# Patient Record
Sex: FEMALE | Race: WHITE | NOT HISPANIC OR LATINO | URBAN - METROPOLITAN AREA
[De-identification: names, ages, dates, MRNs, and addresses within clinical notes are randomized per-mention and may not be internally consistent; named-entity substitution may affect disease eponyms.]

---

## 2021-11-08 ENCOUNTER — EMERGENCY (EMERGENCY)
Facility: HOSPITAL | Age: 46
LOS: 0 days | Discharge: HOME | End: 2021-11-08
Attending: EMERGENCY MEDICINE | Admitting: EMERGENCY MEDICINE
Payer: MEDICAID

## 2021-11-08 VITALS
TEMPERATURE: 98 F | DIASTOLIC BLOOD PRESSURE: 70 MMHG | OXYGEN SATURATION: 99 % | SYSTOLIC BLOOD PRESSURE: 113 MMHG | WEIGHT: 160.06 LBS | HEART RATE: 79 BPM | RESPIRATION RATE: 18 BRPM

## 2021-11-08 DIAGNOSIS — K08.89 OTHER SPECIFIED DISORDERS OF TEETH AND SUPPORTING STRUCTURES: ICD-10-CM

## 2021-11-08 DIAGNOSIS — K02.9 DENTAL CARIES, UNSPECIFIED: ICD-10-CM

## 2021-11-08 PROCEDURE — 99284 EMERGENCY DEPT VISIT MOD MDM: CPT

## 2021-11-08 NOTE — ED PROVIDER NOTE - CLINICAL SUMMARY MEDICAL DECISION MAKING FREE TEXT BOX
46yo F presenting with L mandibular dentalgia x2d after eating last night. Sudden onset. no other complaints. will aou dental

## 2021-11-08 NOTE — ED PROVIDER NOTE - PHYSICAL EXAMINATION
Well appearing, NAD, non toxic. NCAT PERRLA EOMI Airway intact, no drooling, no stridor, no pooling of secretions, no posterior oropharyngeal erythema/edema/lesions. Speaking in full sentences. +tolerating secretions, tolerating PO +L mandibular dental carry normal wob  neuro non focal

## 2021-11-08 NOTE — CONSULT NOTE ADULT - SUBJECTIVE AND OBJECTIVE BOX
Patient is a 45y old female who presents with a chief complaint of pain on the lower left side on the jaw.     HPI: Patient reported that she broke part of her tooth while eating last night. she reported a mild pain on the lower left tooth.      PAST MEDICAL & SURGICAL HISTORY:  osteoarthritis       ( -  ) heart valve replacement  (  - ) joint replacement  ( -  ) pregnancy    MEDICATIONS  (STANDING): Patient reported taking no medication    MEDICATIONS  (PRN): Patient was prescribed 500 mg of amoxicillin 8qh for 7 days, and 600 mg Ibuprofen 6qh take as needed for pain.       Allergies    No Known Allergies    Intolerances        FAMILY HISTORY:      *SOCIAL HISTORY: (  -) Tobacco; ( - ) ETOH    *Last Dental Visit: 2 years ago, Patient doesnt know who is his private dentist in network     Vital Signs Last 24 Hrs  T(C): 36.9 (13 Aug 2021 11:48), Max: 36.9 (13 Aug 2021 11:48)  T(F): 98.5 (13 Aug 2021 11:48), Max: 98.5 (13 Aug 2021 11:48)  HR: 78 (13 Aug 2021 11:48) (78 - 78)  BP: 110/56 (13 Aug 2021 11:48) (110/56 - 110/56)  BP(mean): --  RR: 18 (13 Aug 2021 11:48) (18 - 18)  SpO2: 99% (13 Aug 2021 11:48) (99% - 99%)    EOE:  TMJ (  -) clicks                     (  -) pops                     (  -) crepitus             Mandible <<FROM>>             Facial bones and MOM <<grossly intact>>             (  -) trismus             ( - ) lymphadenopathy             (  -) swelling             (  -) asymmetry             ( - ) palpation             ( - ) dyspnea             ( - ) dysphagia             ( - ) loss of consciousness    IOE:  <<permanent/primary/mixed>> dentition: <<grossly intact>> OR <<multiple carious teeth>> OR <<multiple missing teeth>>           hard/soft palate:  (   ) palatal torus, <<No pathology noted>>           tongue/FOM <<No pathology noted>>           labial/buccal mucosa <<No pathology noted>>           ( + ) percussion # 18           ( + ) palpation +18           ( - ) swelling            ( - ) abscess           ( - ) sinus tract        Caries: Deep caries in to the pulp, tooth is restorable and need RCT tooth #18      *DENTAL RADIOGRAPHS: 1 Periapical Xray was taken         *ASSESSMENT: radiographically and Clinically the tooth #18 has a deep cavity in to the pulp, No signs of periapical radiolucency, tooth is vital with coronal exposure from caries. Diagnosis " Irreversible Pulpitis with Asymptomatic apical periodontists.  Based on the amount of tooth structure remaining, Tooth # 18 is restorable and need RCT.       *PLAN: Patient was asked to call her assigned in network dentist to start on root canal therapy on tooth #18, In the meanwhile patient was prescribed Amoxicillin 500 mg every 8 hours for 7 days and 600 mg ibuprofen every 6 hours as needed. Patient was recommended to have tooth # 17 extracted prior restoring # 18 with crown.        RECOMMENDATIONS:  1) Patient recommended to see her assigned dentist as soon as possible to get RCT initiated in tooth # 18. Patient was asked to take the prescribed medication on time.   2) Dental F/U with outpatient dentist for comprehensive dental care.   3) If any difficulty swallowing/breathing, fever occur, return to ER.     Dr. Luis, Pager [phone]

## 2021-11-08 NOTE — ED PROVIDER NOTE - OBJECTIVE STATEMENT
46yo F presenting with L mandibular dentalgia x2d after eating last night. Sudden onset. no other complaints.

## 2021-11-12 ENCOUNTER — OUTPATIENT (OUTPATIENT)
Dept: OUTPATIENT SERVICES | Facility: HOSPITAL | Age: 46
LOS: 1 days | Discharge: HOME | End: 2021-11-12

## 2023-04-18 PROBLEM — Z00.00 ENCOUNTER FOR PREVENTIVE HEALTH EXAMINATION: Status: ACTIVE | Noted: 2023-04-18

## 2023-05-05 ENCOUNTER — APPOINTMENT (OUTPATIENT)
Dept: UROGYNECOLOGY | Facility: CLINIC | Age: 48
End: 2023-05-05
Payer: COMMERCIAL

## 2023-05-05 VITALS
SYSTOLIC BLOOD PRESSURE: 119 MMHG | BODY MASS INDEX: 26.46 KG/M2 | HEART RATE: 90 BPM | WEIGHT: 155 LBS | DIASTOLIC BLOOD PRESSURE: 81 MMHG | HEIGHT: 64 IN

## 2023-05-05 DIAGNOSIS — M50.10 CERVICAL DISC DISORDER WITH RADICULOPATHY, UNSPECIFIED CERVICAL REGION: ICD-10-CM

## 2023-05-05 DIAGNOSIS — M19.90 UNSPECIFIED OSTEOARTHRITIS, UNSPECIFIED SITE: ICD-10-CM

## 2023-05-05 DIAGNOSIS — Z87.59 PERSONAL HISTORY OF OTHER COMPLICATIONS OF PREGNANCY, CHILDBIRTH AND THE PUERPERIUM: ICD-10-CM

## 2023-05-05 DIAGNOSIS — Z78.9 OTHER SPECIFIED HEALTH STATUS: ICD-10-CM

## 2023-05-05 PROCEDURE — 99205 OFFICE O/P NEW HI 60 MIN: CPT | Mod: 25

## 2023-05-05 PROCEDURE — 51701 INSERT BLADDER CATHETER: CPT

## 2023-05-07 LAB — URINE CULTURE <10: NORMAL

## 2023-05-08 NOTE — COUNSELING
[FreeTextEntry1] : \par We will notify you of the urine results if they are abnormal\par \par Referral to pelvic floor physical therapy\par \par [facility]\par [address] [place] [address]\par Phone: [phone]\par \par [facility]\par [address]\par [facility] [address]\par \par \par Please schedule a pessary fitting with my PAVanita

## 2023-05-08 NOTE — HISTORY OF PRESENT ILLNESS
[FreeTextEntry1] : \par Pt with pelvic floor dysfunction here for urogynecologic evaluation. She describes: \par \par Chief PFD: stress incontinence, "too loose during sex"\par \par Pelvic organ prolapse: s/p c/s x 2, s/p tubal, no bulge, denies splinting\par Stress urinary incontinence: with cough, sneeze, laugh, lifting, LIZET: S>U\par Overactive bladder syndrome: voids q30 minutes secondary to urgency and incontinence, urge incontinence a couple of times per week, for around 1 year, no prior treatment, no glaucoma\par Voiding dysfunction: no Incomplete bladder emptying, no hesitancy \par Lower urinary tract/vaginal symptoms: no recurrent UTIs per year, no hematuria, no dysuria, no bladder pain \par Fecal incontinence: denies\par Defecatory dysfunction: sausage and Type I\par Sexual dysfunction: intermittent pain deeper in \par Pelvic pain: denies\par Vaginal dryness: denies\par \par Her pelvic floor symptoms are significantly bothersome and negatively impacting her quality of life. \par \par

## 2023-05-08 NOTE — DISCUSSION/SUMMARY
[FreeTextEntry1] : \par Mixed incontinence\par The pathophysiology of the above condition was discussed with the patient. The management options for stress incontinence were discussed including observation, pessary placement, pelvic floor physical therapy or surgery. We will follow up on her urine tests. The patient voiced understanding and agrees with a referral to PT and pessary management. She will be scheduled for a pessary fitting. \par

## 2023-05-08 NOTE — PHYSICAL EXAM
[Chaperone Present] : A chaperone was present in the examining room during all aspects of the physical examination [FreeTextEntry1] : Void: 300 cc\par PVR: 20 cc\par Urethra was prepped in sterile fashion and then a sterile non- indwelling catheter (14F) was used by me to drain the bladder. The patient tolerated the procedure well.\par Indication: mixed incontinence\par  \par Well healed incision: Pfannenstiel\par normal perineal sensation\par normal perineal reflexes\par negative cough stress test\par positive atrophy\par no prolapse\par positive urethral hypermobility\par bilateral levator ani spasm, positive R tenderness\par no urethral tenderness\par no bladder tenderness\par no cervical tenderness\par 1/5 Kegel\par

## 2023-05-17 ENCOUNTER — APPOINTMENT (OUTPATIENT)
Dept: UROGYNECOLOGY | Facility: CLINIC | Age: 48
End: 2023-05-17

## 2023-05-19 ENCOUNTER — TRANSCRIPTION ENCOUNTER (OUTPATIENT)
Age: 48
End: 2023-05-19

## 2023-05-30 ENCOUNTER — APPOINTMENT (OUTPATIENT)
Dept: UROGYNECOLOGY | Facility: CLINIC | Age: 48
End: 2023-05-30
Payer: COMMERCIAL

## 2023-05-30 VITALS
HEIGHT: 64 IN | SYSTOLIC BLOOD PRESSURE: 130 MMHG | HEART RATE: 82 BPM | BODY MASS INDEX: 26.46 KG/M2 | DIASTOLIC BLOOD PRESSURE: 85 MMHG | WEIGHT: 155 LBS

## 2023-05-30 PROCEDURE — A4562: CPT

## 2023-05-30 PROCEDURE — 57160 INSERT PESSARY/OTHER DEVICE: CPT

## 2023-05-30 NOTE — COUNSELING
[FreeTextEntry1] : Please call the office if you have any issues with vaginal pain, vaginal bleeding, difficulty urinating or having a bowel movement or if the pessary falls out so that we can arrange another size pessary.\par \par Please follow up for pessary maintenance in 2-3 weeks with KWASI Waldorn\par

## 2023-05-30 NOTE — HISTORY OF PRESENT ILLNESS
[FreeTextEntry1] : Patient is here for pessary fitting. \par Last seen 5/5/2023 for stress incontinence\par \par s/p c/s x 2, s/p tubal\par \par LIZET: S>U\par \par Patient is here for a pessary fitting for stress incontinence. Notes that she wants to try a pessary and if it does not help, she will consider surgical management.

## 2023-05-30 NOTE — DISCUSSION/SUMMARY
[FreeTextEntry1] : \par Mixed Incontinence:\par Dish with support with knob # 2  placed without difficulty. Remained in place with Valsalva, coughing, and ambulating. \par The patient tolerated all fittings well.\par NEW [facility] with support with knob # 2 placed\par Precautions reviewed\par Will return for follow up pessary check in 2-3 weeks or earlier if she has any issues\par

## 2023-06-21 ENCOUNTER — APPOINTMENT (OUTPATIENT)
Dept: UROGYNECOLOGY | Facility: CLINIC | Age: 48
End: 2023-06-21
Payer: COMMERCIAL

## 2023-06-21 VITALS
SYSTOLIC BLOOD PRESSURE: 108 MMHG | DIASTOLIC BLOOD PRESSURE: 78 MMHG | BODY MASS INDEX: 27.31 KG/M2 | HEART RATE: 96 BPM | WEIGHT: 160 LBS | HEIGHT: 64 IN

## 2023-06-21 PROCEDURE — 99214 OFFICE O/P EST MOD 30 MIN: CPT

## 2023-06-21 NOTE — HISTORY OF PRESENT ILLNESS
[FreeTextEntry1] : Patient is here for routine pessary cleaning for mixed incontinence\par Last seen 5/30/2023 for pessary fitting. \par \par s/p c/s x 2, s/p tubal\par \par LIZET: S>U\par \par Fitted: Dish with support with knob #2 \par \par Today, patient is doing well and has no complaints. Notes that she was coughing a lot and experienced a small amount of leakage on Sunday. She is very happy with the pessary but is considering surgical management in the future. \par \par

## 2023-06-21 NOTE — DISCUSSION/SUMMARY
[FreeTextEntry1] : Mixed Incontinence\par Dish with support with knob # 2 removed, cleaned, inspected and reinserted. The patient tolerated this well. Knob was turned to the side and not positioned under the urethra. \par No bleeding, lesions, abnormal discharge were noted. \par The patient will follow up in 3 months for routine pessary management.\par \par Patient is considering surgical management, she will call the office if she wants to go ahead with surgical management and schedule UDS and surgical counseling with Dr. Fields. \par

## 2023-06-21 NOTE — COUNSELING
[FreeTextEntry1] : Please call the office if you have any issues with vaginal pain, vaginal bleeding, difficulty urinating or having a bowel movement or if the pessary falls out so that we can arrange another size pessary.\par \par Please follow up for pessary maintenance in 3 months with KWASI Waldron\par

## 2023-09-20 ENCOUNTER — APPOINTMENT (OUTPATIENT)
Dept: UROGYNECOLOGY | Facility: CLINIC | Age: 48
End: 2023-09-20

## 2023-09-27 ENCOUNTER — APPOINTMENT (OUTPATIENT)
Dept: PEDIATRIC ALLERGY IMMUNOLOGY | Facility: CLINIC | Age: 48
End: 2023-09-27
Payer: MEDICAID

## 2023-09-27 ENCOUNTER — APPOINTMENT (OUTPATIENT)
Dept: UROGYNECOLOGY | Facility: CLINIC | Age: 48
End: 2023-09-27
Payer: MEDICAID

## 2023-09-27 VITALS
HEIGHT: 64 IN | SYSTOLIC BLOOD PRESSURE: 94 MMHG | WEIGHT: 160 LBS | HEART RATE: 105 BPM | BODY MASS INDEX: 27.31 KG/M2 | DIASTOLIC BLOOD PRESSURE: 63 MMHG

## 2023-09-27 VITALS
HEIGHT: 64 IN | DIASTOLIC BLOOD PRESSURE: 80 MMHG | SYSTOLIC BLOOD PRESSURE: 120 MMHG | BODY MASS INDEX: 28 KG/M2 | WEIGHT: 164 LBS

## 2023-09-27 DIAGNOSIS — N39.46 MIXED INCONTINENCE: ICD-10-CM

## 2023-09-27 PROCEDURE — 99204 OFFICE O/P NEW MOD 45 MIN: CPT | Mod: 25

## 2023-09-27 PROCEDURE — 94060 EVALUATION OF WHEEZING: CPT

## 2023-09-27 PROCEDURE — 99214 OFFICE O/P EST MOD 30 MIN: CPT

## 2023-09-29 ENCOUNTER — APPOINTMENT (OUTPATIENT)
Dept: PEDIATRIC ALLERGY IMMUNOLOGY | Facility: CLINIC | Age: 48
End: 2023-09-29
Payer: MEDICAID

## 2023-09-29 VITALS — HEIGHT: 64 IN | BODY MASS INDEX: 28 KG/M2 | WEIGHT: 164 LBS

## 2023-09-29 DIAGNOSIS — J45.40 MODERATE PERSISTENT ASTHMA, UNCOMPLICATED: ICD-10-CM

## 2023-09-29 PROCEDURE — 99213 OFFICE O/P EST LOW 20 MIN: CPT | Mod: 25

## 2023-09-29 PROCEDURE — 95004 PERQ TESTS W/ALRGNC XTRCS: CPT

## 2023-10-06 ENCOUNTER — APPOINTMENT (OUTPATIENT)
Dept: PEDIATRIC ALLERGY IMMUNOLOGY | Facility: CLINIC | Age: 48
End: 2023-10-06
Payer: MEDICAID

## 2023-10-06 DIAGNOSIS — J30.2 OTHER ALLERGIC RHINITIS: ICD-10-CM

## 2023-10-06 DIAGNOSIS — J30.89 OTHER ALLERGIC RHINITIS: ICD-10-CM

## 2023-10-06 PROCEDURE — 95117 IMMUNOTHERAPY INJECTIONS: CPT

## 2023-10-13 ENCOUNTER — APPOINTMENT (OUTPATIENT)
Dept: PEDIATRIC ALLERGY IMMUNOLOGY | Facility: CLINIC | Age: 48
End: 2023-10-13
Payer: MEDICAID

## 2023-10-13 PROCEDURE — 95117 IMMUNOTHERAPY INJECTIONS: CPT

## 2023-10-20 ENCOUNTER — RX RENEWAL (OUTPATIENT)
Age: 48
End: 2023-10-20

## 2023-10-20 RX ORDER — FLUTICASONE PROPIONATE 220 UG/1
220 AEROSOL, METERED RESPIRATORY (INHALATION) TWICE DAILY
Qty: 1 | Refills: 0 | Status: ACTIVE | COMMUNITY
Start: 2023-09-27 | End: 1900-01-01

## 2023-10-27 ENCOUNTER — APPOINTMENT (OUTPATIENT)
Dept: PEDIATRIC ALLERGY IMMUNOLOGY | Facility: CLINIC | Age: 48
End: 2023-10-27
Payer: MEDICAID

## 2023-10-27 PROCEDURE — 95117 IMMUNOTHERAPY INJECTIONS: CPT

## 2023-10-31 ENCOUNTER — APPOINTMENT (OUTPATIENT)
Dept: PEDIATRIC ALLERGY IMMUNOLOGY | Facility: CLINIC | Age: 48
End: 2023-10-31
Payer: MEDICAID

## 2023-10-31 PROCEDURE — 95165 ANTIGEN THERAPY SERVICES: CPT

## 2023-11-03 ENCOUNTER — APPOINTMENT (OUTPATIENT)
Dept: PEDIATRIC ALLERGY IMMUNOLOGY | Facility: CLINIC | Age: 48
End: 2023-11-03
Payer: MEDICAID

## 2023-11-03 PROCEDURE — 95117 IMMUNOTHERAPY INJECTIONS: CPT

## 2023-11-10 ENCOUNTER — APPOINTMENT (OUTPATIENT)
Dept: PEDIATRIC ALLERGY IMMUNOLOGY | Facility: CLINIC | Age: 48
End: 2023-11-10
Payer: MEDICAID

## 2023-11-10 PROCEDURE — 95117 IMMUNOTHERAPY INJECTIONS: CPT

## 2023-11-10 RX ORDER — FEXOFENADINE HYDROCHLORIDE 180 MG/1
180 TABLET ORAL DAILY
Qty: 30 | Refills: 2 | Status: ACTIVE | COMMUNITY
Start: 2023-11-10 | End: 1900-01-01

## 2023-11-15 ENCOUNTER — OUTPATIENT (OUTPATIENT)
Dept: OUTPATIENT SERVICES | Facility: HOSPITAL | Age: 48
LOS: 1 days | End: 2023-11-15
Payer: COMMERCIAL

## 2023-11-15 DIAGNOSIS — K02.9 DENTAL CARIES, UNSPECIFIED: ICD-10-CM

## 2023-11-15 DIAGNOSIS — K01.1 IMPACTED TEETH: ICD-10-CM

## 2023-11-15 PROCEDURE — D0220: CPT

## 2023-11-15 PROCEDURE — D0120: CPT

## 2023-11-15 PROCEDURE — D0140: CPT

## 2023-11-17 ENCOUNTER — APPOINTMENT (OUTPATIENT)
Dept: PEDIATRIC ALLERGY IMMUNOLOGY | Facility: CLINIC | Age: 48
End: 2023-11-17
Payer: MEDICAID

## 2023-11-17 DIAGNOSIS — J30.1 ALLERGIC RHINITIS DUE TO POLLEN: ICD-10-CM

## 2023-11-17 PROCEDURE — 95117 IMMUNOTHERAPY INJECTIONS: CPT

## 2023-11-21 ENCOUNTER — APPOINTMENT (OUTPATIENT)
Dept: UROGYNECOLOGY | Facility: CLINIC | Age: 48
End: 2023-11-21

## 2023-11-29 ENCOUNTER — RX RENEWAL (OUTPATIENT)
Age: 48
End: 2023-11-29

## 2023-11-29 RX ORDER — MONTELUKAST 10 MG/1
10 TABLET, FILM COATED ORAL
Qty: 30 | Refills: 0 | Status: ACTIVE | COMMUNITY
Start: 2023-09-27 | End: 1900-01-01

## 2023-12-04 ENCOUNTER — APPOINTMENT (OUTPATIENT)
Dept: UROGYNECOLOGY | Facility: CLINIC | Age: 48
End: 2023-12-04

## 2024-02-15 ENCOUNTER — RX RENEWAL (OUTPATIENT)
Age: 49
End: 2024-02-15

## 2024-02-21 ENCOUNTER — RX RENEWAL (OUTPATIENT)
Age: 49
End: 2024-02-21

## 2024-03-18 ENCOUNTER — APPOINTMENT (OUTPATIENT)
Dept: PEDIATRIC ALLERGY IMMUNOLOGY | Facility: CLINIC | Age: 49
End: 2024-03-18

## 2024-05-28 ENCOUNTER — APPOINTMENT (OUTPATIENT)
Dept: PEDIATRIC ALLERGY IMMUNOLOGY | Facility: CLINIC | Age: 49
End: 2024-05-28

## 2024-10-02 ENCOUNTER — APPOINTMENT (OUTPATIENT)
Dept: PAIN MANAGEMENT | Facility: CLINIC | Age: 49
End: 2024-10-02
Payer: MEDICAID

## 2024-10-02 DIAGNOSIS — M54.12 RADICULOPATHY, CERVICAL REGION: ICD-10-CM

## 2024-10-02 PROCEDURE — 99204 OFFICE O/P NEW MOD 45 MIN: CPT

## 2024-10-02 RX ORDER — IBUPROFEN 600 MG/1
600 TABLET, FILM COATED ORAL TWICE DAILY
Qty: 60 | Refills: 0 | Status: ACTIVE | COMMUNITY
Start: 2024-10-02 | End: 1900-01-01

## 2024-10-02 RX ORDER — CYCLOBENZAPRINE HYDROCHLORIDE 10 MG/1
10 TABLET, FILM COATED ORAL 3 TIMES DAILY
Qty: 90 | Refills: 1 | Status: ACTIVE | COMMUNITY
Start: 2024-10-02 | End: 1900-01-01

## 2024-10-03 NOTE — HISTORY OF PRESENT ILLNESS
[FreeTextEntry1] : Patient is a 48-year-old female who is here today to establish care for neck and lower back pain. As for her neck pain in, patient's status post C2-T2 cervical fusion x 2, first 1 was done in March or 2023 and the second 1 was on March 2, 2024 by Dr. Razo at [facility].  Patient states that since the surgery her pain has gotten better and her radicular feature has improved, patient is no longer getting numbness or tingling down her upper extremities.  She has started neck PT 3 weeks ago, she continues PT 2-3 times a week. As for her lower back pain, pain has been going on for the past 2 to 3 years and is gradually getting worse, pain associated with numbness tingling and sharp shooting pain that is going down her left lower extremity, patient states that she feels weak and that both of her legs feel heavy most of the time.  Pain is worse when she stands or walks for a long period of time and it gets better when she sits and relaxes, pain is worse at night and sometimes she is unable to sleep. Patient has been managing her pain with ibuprofen 600 mg as needed and cyclobenzaprine as needed.

## 2024-10-03 NOTE — DISCUSSION/SUMMARY
[de-identified] : A discussion regarding available pain management treatment options occurred with the patient. These included interventional, rehabilitative, pharmacological, and alternative modalities. We will proceed with the following:  Interventional/ Procedures: 1. None indicated at this time.  We will possibly do a future epidural injection depending on the MRI results Patient had cervical and lumbar MRI at [facility], we will obtain results from the hospital   Medication/pharmacological: 1. Ordered cyclobenzaprine 10 mg 3 times daily as needed - We are prescribing a muscle relaxant medication to help the patient's muscle spasm pain. The patient understands to not take this medication before driving or operating any heavy machinery as it can be sedating. 2. Ordered ibuprofen 600 mg twice daily - I refilled a 30-day supply today. - Pt was advised to take the medication daily for 15 days, then take a one-week break, following that they could take it PRN. - I advised the patient that the NSAID should be taken with food. In addition, while taking the prescribed NSAID, no over the counter or other NSAIDs should be used, such as ibuprofen (Motrin or Advil) or naproxen (Aleve) as this can cause stomach upset or other side effects. If needed for fever or breakthrough pain Tylenol can be used.     Rehabilitative/alternative modalities: 1. Initiate physician directed activity and lifestyle modifications. 2. Participation in active HEP was discussed and printed. 3. Patient was advised to stay away from any heavy lifting. If needed, she was advised to squat and not bend forward.      Total clinician time spent today on the patient is 30 minutes including preparing to see the patient, obtaining and/or reviewing and confirming history, performing medically necessary and appropriate examination, counseling and educating the patient and/or family, documenting clinical information in the EHR and communicating and/or referring to other healthcare professionals.  EBEN Wright DO

## 2024-10-03 NOTE — PHYSICAL EXAM
[de-identified] : Cervical Spine Exam: Inspection: Erythema: (-) Ecchymosis: (-) Rashes: (-)   Palpation: Trapezius spasms (+) Trapezius tenderness (+) Paracervical spasms (+) Paracervical tenderness (+) Rhomboid spasms (-) Rhomboid tenderness (-)   Cervical  ROM Limited ROM and pain with Forward Flexion Limited ROM and pain with LT and RT rotation     Strength: Deltoid: R (5/5) L (5/5) Biceps: R (5/5) L (5/5) Triceps: R (5/5) L (5/5) Wrist flexors: R (5/5) L (4+/5) Finger flexors: R (5/5) L (4+/5) Grasp: R (5/5) L (4+/5)   Special Tests: Spurling: R (-) ; L (-) Facet loading (Bush's test): R (-) ; L (-) Vicente reflex: R (-) ; L (-)   Neurologic: Light touch intact throughout LE Reflexes normal and symmetric       Lumbar Spine Exam:   Inspection: Erythema: (-) Ecchymosis: (-) Rashes: (-)     Alignment: No spine misalignment, scoliosis, or Kyphosis  Elevated right rib hump with forward bending: (-) Elevated left rib hump with forward bending: (-)  RT/LT scapula winging (-)       Palpation: Midline lumbar tenderness: (+)      Lumbar ROM  Limited ROM and pain with Flexion       Strength: Hip Flexion: R (4+/5) L (4+/5) Knee extension: R (4+/5) L (4+/5)  Knee flexion: R (5/5) L (5/5) Ankle Dorsiflexion: R (5/5) L(5/5) EHL: R (5/5) L (5/5) Ankle Plantarflexion: R (5/5) L (5/5)       Special Tests: - Positive SLR: Positive bilaterally - Positive Slump test: Positive bilaterally - Negative Facet loading/Extension Rotation Test b/l - BRET test: Negative bilaterally       Neurologic: Light touch intact throughout LE Reflexes normal and symmetric       Gait: Normal gait, stable, walks without assistance. Normal toe and heel walking. No signs of foot drop, Drag and slap test (-)

## 2024-10-03 NOTE — HISTORY OF PRESENT ILLNESS
[FreeTextEntry1] : Patient is a 48-year-old female who is here today to establish care for neck and lower back pain. As for her neck pain in, patient's status post C2-T2 cervical fusion x 2, first 1 was done in March or 2023 and the second 1 was on March 2, 2024 by Dr. Razo at [facility].  Patient states that since the surgery her pain has gotten better and her radicular feature has improved, patient is no longer getting numbness or tingling down her upper extremities.  She has started neck PT 3 weeks ago, she continues PT 2-3 times a week. As for her lower back pain, pain has been going on for the past 2 to 3 years and is gradually getting worse, pain associated with numbness tingling and sharp shooting pain that is going down her left lower extremity, patient states that she feels weak and that both of her legs feel heavy most of the time.  Pain is worse when she stands or walks for a long period of time and it gets better when she sits and relaxes, pain is worse at night and sometimes she is unable to sleep. Patient has been managing her pain with ibuprofen 600 mg as needed and cyclobenzaprine as needed.

## 2024-10-03 NOTE — PHYSICAL EXAM
[de-identified] : Cervical Spine Exam: Inspection: Erythema: (-) Ecchymosis: (-) Rashes: (-)   Palpation: Trapezius spasms (+) Trapezius tenderness (+) Paracervical spasms (+) Paracervical tenderness (+) Rhomboid spasms (-) Rhomboid tenderness (-)   Cervical  ROM Limited ROM and pain with Forward Flexion Limited ROM and pain with LT and RT rotation     Strength: Deltoid: R (5/5) L (5/5) Biceps: R (5/5) L (5/5) Triceps: R (5/5) L (5/5) Wrist flexors: R (5/5) L (4+/5) Finger flexors: R (5/5) L (4+/5) Grasp: R (5/5) L (4+/5)   Special Tests: Spurling: R (-) ; L (-) Facet loading (Bush's test): R (-) ; L (-) Vicente reflex: R (-) ; L (-)   Neurologic: Light touch intact throughout LE Reflexes normal and symmetric       Lumbar Spine Exam:   Inspection: Erythema: (-) Ecchymosis: (-) Rashes: (-)     Alignment: No spine misalignment, scoliosis, or Kyphosis  Elevated right rib hump with forward bending: (-) Elevated left rib hump with forward bending: (-)  RT/LT scapula winging (-)       Palpation: Midline lumbar tenderness: (+)      Lumbar ROM  Limited ROM and pain with Flexion       Strength: Hip Flexion: R (4+/5) L (4+/5) Knee extension: R (4+/5) L (4+/5)  Knee flexion: R (5/5) L (5/5) Ankle Dorsiflexion: R (5/5) L(5/5) EHL: R (5/5) L (5/5) Ankle Plantarflexion: R (5/5) L (5/5)       Special Tests: - Positive SLR: Positive bilaterally - Positive Slump test: Positive bilaterally - Negative Facet loading/Extension Rotation Test b/l - BRET test: Negative bilaterally       Neurologic: Light touch intact throughout LE Reflexes normal and symmetric       Gait: Normal gait, stable, walks without assistance. Normal toe and heel walking. No signs of foot drop, Drag and slap test (-)

## 2024-10-03 NOTE — DISCUSSION/SUMMARY
[de-identified] : A discussion regarding available pain management treatment options occurred with the patient. These included interventional, rehabilitative, pharmacological, and alternative modalities. We will proceed with the following:  Interventional/ Procedures: 1. None indicated at this time.  We will possibly do a future epidural injection depending on the MRI results Patient had cervical and lumbar MRI at [facility], we will obtain results from the hospital   Medication/pharmacological: 1. Ordered cyclobenzaprine 10 mg 3 times daily as needed - We are prescribing a muscle relaxant medication to help the patient's muscle spasm pain. The patient understands to not take this medication before driving or operating any heavy machinery as it can be sedating. 2. Ordered ibuprofen 600 mg twice daily - I refilled a 30-day supply today. - Pt was advised to take the medication daily for 15 days, then take a one-week break, following that they could take it PRN. - I advised the patient that the NSAID should be taken with food. In addition, while taking the prescribed NSAID, no over the counter or other NSAIDs should be used, such as ibuprofen (Motrin or Advil) or naproxen (Aleve) as this can cause stomach upset or other side effects. If needed for fever or breakthrough pain Tylenol can be used.     Rehabilitative/alternative modalities: 1. Initiate physician directed activity and lifestyle modifications. 2. Participation in active HEP was discussed and printed. 3. Patient was advised to stay away from any heavy lifting. If needed, she was advised to squat and not bend forward.      Total clinician time spent today on the patient is 30 minutes including preparing to see the patient, obtaining and/or reviewing and confirming history, performing medically necessary and appropriate examination, counseling and educating the patient and/or family, documenting clinical information in the EHR and communicating and/or referring to other healthcare professionals.  EBEN Wright DO

## 2024-10-09 ENCOUNTER — APPOINTMENT (OUTPATIENT)
Dept: PAIN MANAGEMENT | Facility: CLINIC | Age: 49
End: 2024-10-09
Payer: MEDICAID

## 2024-10-09 DIAGNOSIS — M54.16 RADICULOPATHY, LUMBAR REGION: ICD-10-CM

## 2024-10-09 PROCEDURE — 99214 OFFICE O/P EST MOD 30 MIN: CPT

## 2024-10-09 RX ORDER — PREGABALIN 75 MG/1
75 CAPSULE ORAL TWICE DAILY
Qty: 60 | Refills: 0 | Status: ACTIVE | COMMUNITY
Start: 2024-10-09 | End: 1900-01-01

## 2024-10-24 ENCOUNTER — APPOINTMENT (OUTPATIENT)
Dept: PAIN MANAGEMENT | Facility: CLINIC | Age: 49
End: 2024-10-24

## 2024-10-24 ENCOUNTER — APPOINTMENT (OUTPATIENT)
Facility: CLINIC | Age: 49
End: 2024-10-24

## 2024-11-04 ENCOUNTER — RX RENEWAL (OUTPATIENT)
Age: 49
End: 2024-11-04

## 2024-11-11 ENCOUNTER — APPOINTMENT (OUTPATIENT)
Facility: CLINIC | Age: 49
End: 2024-11-11
Payer: MEDICAID

## 2024-11-11 ENCOUNTER — APPOINTMENT (OUTPATIENT)
Dept: PAIN MANAGEMENT | Facility: CLINIC | Age: 49
End: 2024-11-11
Payer: MEDICAID

## 2024-11-11 DIAGNOSIS — M54.16 RADICULOPATHY, LUMBAR REGION: ICD-10-CM

## 2024-11-11 PROCEDURE — 00630 ANES PX LUMBAR REGION NOS: CPT | Mod: QZ,P2

## 2024-11-11 PROCEDURE — 64483 NJX AA&/STRD TFRM EPI L/S 1: CPT | Mod: 50

## 2024-11-27 ENCOUNTER — APPOINTMENT (OUTPATIENT)
Dept: PAIN MANAGEMENT | Facility: CLINIC | Age: 49
End: 2024-11-27

## 2025-06-03 ENCOUNTER — APPOINTMENT (OUTPATIENT)
Dept: UROGYNECOLOGY | Facility: CLINIC | Age: 50
End: 2025-06-03
Payer: MEDICAID

## 2025-06-03 ENCOUNTER — APPOINTMENT (OUTPATIENT)
Dept: UROGYNECOLOGY | Facility: CLINIC | Age: 50
End: 2025-06-03

## 2025-06-03 VITALS
HEIGHT: 64 IN | BODY MASS INDEX: 27.31 KG/M2 | DIASTOLIC BLOOD PRESSURE: 72 MMHG | HEART RATE: 72 BPM | SYSTOLIC BLOOD PRESSURE: 112 MMHG | WEIGHT: 160 LBS

## 2025-06-03 PROBLEM — R39.14 FEELING OF INCOMPLETE BLADDER EMPTYING: Status: ACTIVE | Noted: 2025-06-03

## 2025-06-03 PROCEDURE — 99213 OFFICE O/P EST LOW 20 MIN: CPT | Mod: 25

## 2025-06-03 PROCEDURE — 51701 INSERT BLADDER CATHETER: CPT

## 2025-06-03 PROCEDURE — 99459 PELVIC EXAMINATION: CPT

## 2025-06-06 ENCOUNTER — APPOINTMENT (OUTPATIENT)
Dept: UROGYNECOLOGY | Facility: CLINIC | Age: 50
End: 2025-06-06
Payer: MEDICAID

## 2025-06-06 VITALS
WEIGHT: 160 LBS | SYSTOLIC BLOOD PRESSURE: 115 MMHG | HEIGHT: 64 IN | BODY MASS INDEX: 27.31 KG/M2 | DIASTOLIC BLOOD PRESSURE: 80 MMHG | HEART RATE: 80 BPM

## 2025-06-06 LAB
BILIRUB UR QL STRIP: NORMAL
CLARITY UR: CLEAR
COLLECTION METHOD: NORMAL
GLUCOSE UR-MCNC: NORMAL
HCG UR QL: 0.2 EU/DL
HCG UR QL: NEGATIVE
HGB UR QL STRIP.AUTO: NORMAL
KETONES UR-MCNC: NORMAL
LEUKOCYTE ESTERASE UR QL STRIP: NORMAL
NITRITE UR QL STRIP: NORMAL
PH UR STRIP: 7.5
PROT UR STRIP-MCNC: NORMAL
QUALITY CONTROL: YES
SP GR UR STRIP: 1.01
URINE CULTURE <10: NORMAL

## 2025-06-06 PROCEDURE — 51784 ANAL/URINARY MUSCLE STUDY: CPT

## 2025-06-06 PROCEDURE — 51741 ELECTRO-UROFLOWMETRY FIRST: CPT

## 2025-06-06 PROCEDURE — 51797 INTRAABDOMINAL PRESSURE TEST: CPT

## 2025-06-06 PROCEDURE — 81025 URINE PREGNANCY TEST: CPT

## 2025-06-06 PROCEDURE — 81003 URINALYSIS AUTO W/O SCOPE: CPT | Mod: QW

## 2025-06-06 PROCEDURE — 51729 CYSTOMETROGRAM W/VP&UP: CPT

## 2025-06-13 ENCOUNTER — APPOINTMENT (OUTPATIENT)
Dept: UROGYNECOLOGY | Facility: CLINIC | Age: 50
End: 2025-06-13
Payer: MEDICAID

## 2025-06-13 VITALS
SYSTOLIC BLOOD PRESSURE: 110 MMHG | HEART RATE: 96 BPM | BODY MASS INDEX: 27.31 KG/M2 | HEIGHT: 64 IN | DIASTOLIC BLOOD PRESSURE: 72 MMHG | WEIGHT: 160 LBS

## 2025-06-13 PROBLEM — J30.89 SEASONAL AND PERENNIAL ALLERGIC RHINITIS: Status: RESOLVED | Noted: 2023-09-27 | Resolved: 2025-06-13

## 2025-06-13 PROBLEM — R39.14 FEELING OF INCOMPLETE BLADDER EMPTYING: Status: RESOLVED | Noted: 2025-06-03 | Resolved: 2025-06-13

## 2025-06-13 PROBLEM — R29.898 POOR MUSCLE TONE: Status: ACTIVE | Noted: 2025-06-13

## 2025-06-13 PROCEDURE — 99215 OFFICE O/P EST HI 40 MIN: CPT

## 2025-06-13 PROCEDURE — 99459 PELVIC EXAMINATION: CPT

## 2025-07-21 ENCOUNTER — OUTPATIENT (OUTPATIENT)
Dept: OUTPATIENT SERVICES | Facility: HOSPITAL | Age: 50
LOS: 1 days | End: 2025-07-21
Payer: MEDICAID

## 2025-07-21 VITALS
OXYGEN SATURATION: 98 % | SYSTOLIC BLOOD PRESSURE: 121 MMHG | HEIGHT: 64 IN | WEIGHT: 158.07 LBS | HEART RATE: 87 BPM | DIASTOLIC BLOOD PRESSURE: 87 MMHG | RESPIRATION RATE: 18 BRPM | TEMPERATURE: 98 F

## 2025-07-21 DIAGNOSIS — Z01.818 ENCOUNTER FOR OTHER PREPROCEDURAL EXAMINATION: ICD-10-CM

## 2025-07-21 DIAGNOSIS — Z98.1 ARTHRODESIS STATUS: Chronic | ICD-10-CM

## 2025-07-21 DIAGNOSIS — N39.46 MIXED INCONTINENCE: ICD-10-CM

## 2025-07-21 DIAGNOSIS — Z46.89 ENCOUNTER FOR FITTING AND ADJUSTMENT OF OTHER SPECIFIED DEVICES: Chronic | ICD-10-CM

## 2025-07-21 DIAGNOSIS — Z98.51 TUBAL LIGATION STATUS: Chronic | ICD-10-CM

## 2025-07-21 DIAGNOSIS — Z98.891 HISTORY OF UTERINE SCAR FROM PREVIOUS SURGERY: Chronic | ICD-10-CM

## 2025-07-21 LAB
ALBUMIN SERPL ELPH-MCNC: 4.6 G/DL — SIGNIFICANT CHANGE UP (ref 3.5–5.2)
ALP SERPL-CCNC: 79 U/L — SIGNIFICANT CHANGE UP (ref 30–115)
ALT FLD-CCNC: 19 U/L — SIGNIFICANT CHANGE UP (ref 0–41)
ANION GAP SERPL CALC-SCNC: 15 MMOL/L — HIGH (ref 7–14)
APPEARANCE UR: CLEAR — SIGNIFICANT CHANGE UP
AST SERPL-CCNC: 18 U/L — SIGNIFICANT CHANGE UP (ref 0–41)
BASOPHILS # BLD AUTO: 0.04 K/UL — SIGNIFICANT CHANGE UP (ref 0–0.2)
BASOPHILS NFR BLD AUTO: 0.5 % — SIGNIFICANT CHANGE UP (ref 0–1)
BILIRUB SERPL-MCNC: 0.4 MG/DL — SIGNIFICANT CHANGE UP (ref 0.2–1.2)
BILIRUB UR-MCNC: NEGATIVE — SIGNIFICANT CHANGE UP
BUN SERPL-MCNC: 13 MG/DL — SIGNIFICANT CHANGE UP (ref 10–20)
CALCIUM SERPL-MCNC: 9.3 MG/DL — SIGNIFICANT CHANGE UP (ref 8.4–10.5)
CHLORIDE SERPL-SCNC: 102 MMOL/L — SIGNIFICANT CHANGE UP (ref 98–110)
CO2 SERPL-SCNC: 22 MMOL/L — SIGNIFICANT CHANGE UP (ref 17–32)
COLOR SPEC: SIGNIFICANT CHANGE UP
CREAT SERPL-MCNC: 0.7 MG/DL — SIGNIFICANT CHANGE UP (ref 0.7–1.5)
DIFF PNL FLD: NEGATIVE — SIGNIFICANT CHANGE UP
EGFR: 107 ML/MIN/1.73M2 — SIGNIFICANT CHANGE UP
EGFR: 107 ML/MIN/1.73M2 — SIGNIFICANT CHANGE UP
EOSINOPHIL # BLD AUTO: 0.72 K/UL — HIGH (ref 0–0.7)
EOSINOPHIL NFR BLD AUTO: 8.6 % — HIGH (ref 0–8)
GLUCOSE SERPL-MCNC: 87 MG/DL — SIGNIFICANT CHANGE UP (ref 70–99)
GLUCOSE UR QL: NEGATIVE MG/DL — SIGNIFICANT CHANGE UP
HCT VFR BLD CALC: 43.6 % — SIGNIFICANT CHANGE UP (ref 37–47)
HCV AB S/CO SERPL IA: 0.05 COI — SIGNIFICANT CHANGE UP
HCV AB SERPL-IMP: SIGNIFICANT CHANGE UP
HGB BLD-MCNC: 14 G/DL — SIGNIFICANT CHANGE UP (ref 12–16)
IMM GRANULOCYTES NFR BLD AUTO: 0.4 % — HIGH (ref 0.1–0.3)
KETONES UR QL: ABNORMAL MG/DL
LEUKOCYTE ESTERASE UR-ACNC: NEGATIVE — SIGNIFICANT CHANGE UP
LYMPHOCYTES # BLD AUTO: 1.77 K/UL — SIGNIFICANT CHANGE UP (ref 1.2–3.4)
LYMPHOCYTES # BLD AUTO: 21.2 % — SIGNIFICANT CHANGE UP (ref 20.5–51.1)
MCHC RBC-ENTMCNC: 26.6 PG — LOW (ref 27–31)
MCHC RBC-ENTMCNC: 32.1 G/DL — SIGNIFICANT CHANGE UP (ref 32–37)
MCV RBC AUTO: 82.7 FL — SIGNIFICANT CHANGE UP (ref 81–99)
MONOCYTES # BLD AUTO: 0.56 K/UL — SIGNIFICANT CHANGE UP (ref 0.1–0.6)
MONOCYTES NFR BLD AUTO: 6.7 % — SIGNIFICANT CHANGE UP (ref 1.7–9.3)
NEUTROPHILS # BLD AUTO: 5.24 K/UL — SIGNIFICANT CHANGE UP (ref 1.4–6.5)
NEUTROPHILS NFR BLD AUTO: 62.6 % — SIGNIFICANT CHANGE UP (ref 42.2–75.2)
NITRITE UR-MCNC: NEGATIVE — SIGNIFICANT CHANGE UP
NRBC BLD AUTO-RTO: 0 /100 WBCS — SIGNIFICANT CHANGE UP (ref 0–0)
PH UR: 6 — SIGNIFICANT CHANGE UP (ref 5–8)
PLATELET # BLD AUTO: 387 K/UL — SIGNIFICANT CHANGE UP (ref 130–400)
PMV BLD: 9.7 FL — SIGNIFICANT CHANGE UP (ref 7.4–10.4)
POTASSIUM SERPL-MCNC: 4.4 MMOL/L — SIGNIFICANT CHANGE UP (ref 3.5–5)
POTASSIUM SERPL-SCNC: 4.4 MMOL/L — SIGNIFICANT CHANGE UP (ref 3.5–5)
PROT SERPL-MCNC: 7.5 G/DL — SIGNIFICANT CHANGE UP (ref 6–8)
PROT UR-MCNC: SIGNIFICANT CHANGE UP MG/DL
RBC # BLD: 5.27 M/UL — SIGNIFICANT CHANGE UP (ref 4.2–5.4)
RBC # FLD: 14.2 % — SIGNIFICANT CHANGE UP (ref 11.5–14.5)
SODIUM SERPL-SCNC: 139 MMOL/L — SIGNIFICANT CHANGE UP (ref 135–146)
SP GR SPEC: 1.02 — SIGNIFICANT CHANGE UP (ref 1–1.03)
UROBILINOGEN FLD QL: 0.2 MG/DL — SIGNIFICANT CHANGE UP (ref 0.2–1)
WBC # BLD: 8.36 K/UL — SIGNIFICANT CHANGE UP (ref 4.8–10.8)
WBC # FLD AUTO: 8.36 K/UL — SIGNIFICANT CHANGE UP (ref 4.8–10.8)

## 2025-07-21 PROCEDURE — 86803 HEPATITIS C AB TEST: CPT

## 2025-07-21 PROCEDURE — 99214 OFFICE O/P EST MOD 30 MIN: CPT | Mod: 25

## 2025-07-21 PROCEDURE — 81003 URINALYSIS AUTO W/O SCOPE: CPT

## 2025-07-21 PROCEDURE — 85025 COMPLETE CBC W/AUTO DIFF WBC: CPT

## 2025-07-21 PROCEDURE — 36415 COLL VENOUS BLD VENIPUNCTURE: CPT

## 2025-07-21 PROCEDURE — 87086 URINE CULTURE/COLONY COUNT: CPT

## 2025-07-21 PROCEDURE — 80053 COMPREHEN METABOLIC PANEL: CPT

## 2025-07-22 DIAGNOSIS — Z01.818 ENCOUNTER FOR OTHER PREPROCEDURAL EXAMINATION: ICD-10-CM

## 2025-07-22 DIAGNOSIS — N39.46 MIXED INCONTINENCE: ICD-10-CM

## 2025-07-22 LAB
CULTURE RESULTS: SIGNIFICANT CHANGE UP
SPECIMEN SOURCE: SIGNIFICANT CHANGE UP

## 2025-08-04 ENCOUNTER — TRANSCRIPTION ENCOUNTER (OUTPATIENT)
Age: 50
End: 2025-08-04

## 2025-08-04 ENCOUNTER — OUTPATIENT (OUTPATIENT)
Dept: OUTPATIENT SERVICES | Facility: HOSPITAL | Age: 50
LOS: 1 days | Discharge: HOME | End: 2025-08-04
Payer: MEDICAID

## 2025-08-04 VITALS
DIASTOLIC BLOOD PRESSURE: 79 MMHG | WEIGHT: 158.07 LBS | OXYGEN SATURATION: 100 % | SYSTOLIC BLOOD PRESSURE: 116 MMHG | HEIGHT: 64 IN | HEART RATE: 81 BPM | RESPIRATION RATE: 17 BRPM | TEMPERATURE: 98 F

## 2025-08-04 VITALS — SYSTOLIC BLOOD PRESSURE: 113 MMHG | RESPIRATION RATE: 17 BRPM | DIASTOLIC BLOOD PRESSURE: 76 MMHG | HEART RATE: 79 BPM

## 2025-08-04 DIAGNOSIS — N39.46 MIXED INCONTINENCE: ICD-10-CM

## 2025-08-04 DIAGNOSIS — Z46.89 ENCOUNTER FOR FITTING AND ADJUSTMENT OF OTHER SPECIFIED DEVICES: Chronic | ICD-10-CM

## 2025-08-04 DIAGNOSIS — Z98.891 HISTORY OF UTERINE SCAR FROM PREVIOUS SURGERY: Chronic | ICD-10-CM

## 2025-08-04 DIAGNOSIS — Z98.1 ARTHRODESIS STATUS: Chronic | ICD-10-CM

## 2025-08-04 DIAGNOSIS — Z98.51 TUBAL LIGATION STATUS: Chronic | ICD-10-CM

## 2025-08-04 PROCEDURE — 57288 REPAIR BLADDER DEFECT: CPT

## 2025-08-04 PROCEDURE — C9399: CPT

## 2025-08-04 PROCEDURE — C1771: CPT

## 2025-08-04 RX ORDER — BENZONATATE 100 MG
1 CAPSULE ORAL
Refills: 0 | DISCHARGE

## 2025-08-04 RX ORDER — POLYETHYLENE GLYCOL 3350 17 G/17G
17 POWDER, FOR SOLUTION ORAL DAILY
Qty: 1 | Refills: 5 | Status: ACTIVE | COMMUNITY
Start: 2025-08-04 | End: 1900-01-01

## 2025-08-04 RX ORDER — HYDROMORPHONE/SOD CHLOR,ISO/PF 2 MG/10 ML
0.5 SYRINGE (ML) INJECTION
Refills: 0 | Status: DISCONTINUED | OUTPATIENT
Start: 2025-08-04 | End: 2025-08-04

## 2025-08-04 RX ORDER — ACETAMINOPHEN 325 MG/1
325 TABLET ORAL
Qty: 60 | Refills: 1 | Status: ACTIVE | COMMUNITY
Start: 2025-08-04 | End: 1900-01-01

## 2025-08-04 RX ORDER — SODIUM CHLORIDE 9 G/1000ML
1000 INJECTION, SOLUTION INTRAVENOUS
Refills: 0 | Status: DISCONTINUED | OUTPATIENT
Start: 2025-08-04 | End: 2025-08-04

## 2025-08-04 RX ORDER — ONDANSETRON HCL/PF 4 MG/2 ML
4 VIAL (ML) INJECTION ONCE
Refills: 0 | Status: DISCONTINUED | OUTPATIENT
Start: 2025-08-04 | End: 2025-08-04

## 2025-08-04 RX ORDER — ACETAMINOPHEN 500 MG/5ML
1000 LIQUID (ML) ORAL ONCE
Refills: 0 | Status: COMPLETED | OUTPATIENT
Start: 2025-08-04 | End: 2025-08-04

## 2025-08-04 RX ORDER — CYCLOBENZAPRINE HYDROCHLORIDE 15 MG/1
1 CAPSULE, EXTENDED RELEASE ORAL
Refills: 0 | DISCHARGE

## 2025-08-04 RX ORDER — ACETAMINOPHEN 500 MG/5ML
2 LIQUID (ML) ORAL
Refills: 0 | DISCHARGE

## 2025-08-04 RX ORDER — OXYCODONE 5 MG/1
5 TABLET ORAL
Qty: 10 | Refills: 0 | Status: ACTIVE | COMMUNITY
Start: 2025-08-04 | End: 1900-01-01

## 2025-08-04 RX ORDER — IBUPROFEN 600 MG/1
600 TABLET, FILM COATED ORAL
Qty: 60 | Refills: 1 | Status: ACTIVE | COMMUNITY
Start: 2025-08-04 | End: 1900-01-01

## 2025-08-04 RX ADMIN — Medication 0.5 MILLIGRAM(S): at 11:13

## 2025-08-04 RX ADMIN — Medication 400 MILLIGRAM(S): at 13:13

## 2025-08-06 PROBLEM — N39.3 STRESS INCONTINENCE (FEMALE) (MALE): Chronic | Status: ACTIVE | Noted: 2025-07-21

## 2025-08-06 PROBLEM — M19.90 UNSPECIFIED OSTEOARTHRITIS, UNSPECIFIED SITE: Chronic | Status: ACTIVE | Noted: 2025-07-21

## 2025-08-07 ENCOUNTER — APPOINTMENT (OUTPATIENT)
Dept: UROGYNECOLOGY | Facility: CLINIC | Age: 50
End: 2025-08-07
Payer: MEDICAID

## 2025-08-07 VITALS
SYSTOLIC BLOOD PRESSURE: 102 MMHG | DIASTOLIC BLOOD PRESSURE: 76 MMHG | HEART RATE: 86 BPM | HEIGHT: 64 IN | WEIGHT: 158 LBS | BODY MASS INDEX: 26.98 KG/M2

## 2025-08-07 DIAGNOSIS — N39.46 MIXED INCONTINENCE: ICD-10-CM

## 2025-08-07 PROCEDURE — 99024 POSTOP FOLLOW-UP VISIT: CPT

## 2025-08-07 PROCEDURE — 51700 IRRIGATION OF BLADDER: CPT | Mod: 58

## 2025-08-15 ENCOUNTER — LABORATORY RESULT (OUTPATIENT)
Age: 50
End: 2025-08-15

## 2025-08-15 ENCOUNTER — APPOINTMENT (OUTPATIENT)
Dept: UROGYNECOLOGY | Facility: CLINIC | Age: 50
End: 2025-08-15
Payer: MEDICAID

## 2025-08-15 VITALS
HEIGHT: 64 IN | SYSTOLIC BLOOD PRESSURE: 106 MMHG | DIASTOLIC BLOOD PRESSURE: 71 MMHG | WEIGHT: 158 LBS | BODY MASS INDEX: 26.98 KG/M2 | HEART RATE: 91 BPM

## 2025-08-15 DIAGNOSIS — N76.0 ACUTE VAGINITIS: ICD-10-CM

## 2025-08-15 DIAGNOSIS — N39.46 MIXED INCONTINENCE: ICD-10-CM

## 2025-08-15 PROCEDURE — 99024 POSTOP FOLLOW-UP VISIT: CPT

## 2025-08-15 PROCEDURE — 51701 INSERT BLADDER CATHETER: CPT | Mod: 58

## 2025-08-15 RX ORDER — FLUCONAZOLE 150 MG/1
150 TABLET ORAL
Qty: 2 | Refills: 0 | Status: ACTIVE | COMMUNITY
Start: 2025-08-15 | End: 1900-01-01

## 2025-08-18 DIAGNOSIS — N39.0 URINARY TRACT INFECTION, SITE NOT SPECIFIED: ICD-10-CM

## 2025-08-19 RX ORDER — NITROFURANTOIN (MONOHYDRATE/MACROCRYSTALS) 25; 75 MG/1; MG/1
100 CAPSULE ORAL
Qty: 10 | Refills: 0 | Status: ACTIVE | COMMUNITY
Start: 2025-08-19 | End: 1900-01-01

## 2025-08-19 RX ORDER — SULFAMETHOXAZOLE AND TRIMETHOPRIM 800; 160 MG/1; MG/1
800-160 TABLET ORAL TWICE DAILY
Qty: 14 | Refills: 0 | Status: DISCONTINUED | COMMUNITY
Start: 2025-08-18 | End: 2025-08-19

## 2025-08-21 LAB — URINE CULTURE <10: ABNORMAL

## 2025-09-18 ENCOUNTER — APPOINTMENT (OUTPATIENT)
Dept: UROGYNECOLOGY | Facility: CLINIC | Age: 50
End: 2025-09-18
Payer: MEDICAID

## 2025-09-18 VITALS
BODY MASS INDEX: 26.98 KG/M2 | SYSTOLIC BLOOD PRESSURE: 115 MMHG | HEART RATE: 99 BPM | WEIGHT: 158 LBS | HEIGHT: 64 IN | DIASTOLIC BLOOD PRESSURE: 78 MMHG

## 2025-09-18 DIAGNOSIS — J30.1 ALLERGIC RHINITIS DUE TO POLLEN: ICD-10-CM

## 2025-09-18 DIAGNOSIS — N39.0 URINARY TRACT INFECTION, SITE NOT SPECIFIED: ICD-10-CM

## 2025-09-18 DIAGNOSIS — N39.46 MIXED INCONTINENCE: ICD-10-CM

## 2025-09-18 DIAGNOSIS — N76.0 ACUTE VAGINITIS: ICD-10-CM

## 2025-09-18 PROCEDURE — 99024 POSTOP FOLLOW-UP VISIT: CPT
